# Patient Record
Sex: MALE | Race: WHITE | NOT HISPANIC OR LATINO | URBAN - METROPOLITAN AREA
[De-identification: names, ages, dates, MRNs, and addresses within clinical notes are randomized per-mention and may not be internally consistent; named-entity substitution may affect disease eponyms.]

---

## 2023-09-29 ENCOUNTER — TELEPHONE (OUTPATIENT)
Dept: PODIATRY | Facility: CLINIC | Age: 48
End: 2023-09-29

## 2023-09-29 NOTE — TELEPHONE ENCOUNTER
I called and left a message for the patient regarding his orthotics. The insurance does cover orthotics, but he would need to meet his individual deductible first, which is $500. It would be in his best interest to pay for the orthotics as a self-pay, which would be $350. The self-pay amount would be due at the time of his appointment. Please call us at [phone] to let us know if you are going to go forward with the orthotics or not. This way we can prepare for your appointment as needed. If you have any additional questions, please call us at [phone].

## 2024-03-05 ENCOUNTER — TELEPHONE (OUTPATIENT)
Age: 49
End: 2024-03-05

## 2024-03-05 NOTE — TELEPHONE ENCOUNTER
Caller: Scooby Timer    Doctor/Office: Dr. Obando/[facility]#: [phone]    Escalation: Care Patient was last seen in August 23. He now decided he would like to move forward with orthotics. Please return call. Thank you

## 2024-03-07 DIAGNOSIS — Q66.71 PES CAVUS OF BOTH FEET: Primary | ICD-10-CM

## 2024-03-07 DIAGNOSIS — Q66.72 PES CAVUS OF BOTH FEET: Primary | ICD-10-CM

## 2024-03-14 ENCOUNTER — TELEPHONE (OUTPATIENT)
Dept: PAIN MEDICINE | Facility: CLINIC | Age: 49
End: 2024-03-14

## 2024-03-14 NOTE — TELEPHONE ENCOUNTER
Tried to contact Scooby about his orthotic casting appt. Asked him to please return my call so we can schedule the appt for him

## 2024-04-17 NOTE — PROGRESS NOTES
Patient ID: Scooby Barba is a 48 y.o. male Date of Birth 1975       No chief complaint on file.            Diagnosis:  1. Pes cavus of both feet    2. Plantar fasciitis      1. Pedal evaluation with socks and shoes removed.  2. Discussed biomechanics, etiology and treatment options for heel/arch pain.  3. Patient given stretching exercises to do at home, ice, no barefoot.  4.  I inspected patient's current orthotics, the right device fits appropriately, left does not provide adequate biomechanical support and the height of the device does not match the foot structure.  We have precertified new custom orthotics, patient is aware he has a high deductible and will pay out of pocket.  5.  Gait and biomechanical examination was performed.  6.  Custom orthotic casting was performed with plaster Rosa for the molds, forefoot partially loaded, subtalar joint neutral, custom orthotic prescription and order form was filled out for [employer].  7.  Patient understands and agrees with the plan and he will follow-up in 4-6 weeks to  orthotics.         Subjective:   Patient presents today with chief complaint of left heel pain.  He states he is struggled with this every summer but this year it has been the worst, he tends to be quite more active in the summertime and has experienced it historically every summer by the end of the summer it usually goes away, this is not the case this year.  He does wear custom orthotics which are approximately 15 years old.  He does tend to go barefoot, he runs [employer] races, play soccer, washes his cars etc. he presents today to be casted for new custom orthotics.          The following portions of the patient's history were reviewed and updated as appropriate: allergies, current medications, past family history, past medical history, past social history, past surgical history, and problem list.        Objective:  There were no vitals taken for this visit.    Review of Systems    Constitutional:  Negative for chills and fever.   HENT:  Negative for ear pain and sore throat.    Eyes:  Negative for pain and visual disturbance.   Respiratory:  Negative for cough and shortness of breath.    Cardiovascular:  Negative for chest pain and palpitations.   Gastrointestinal:  Negative for abdominal pain and vomiting.   Genitourinary:  Negative for dysuria and hematuria.   Musculoskeletal:  Negative for arthralgias and back pain.        Left heel/arch pain   Skin:  Negative for color change and rash.   Neurological:  Negative for seizures and syncope.   All other systems reviewed and are negative.      Physical Exam  Constitutional:       Appearance: Normal appearance. He is normal weight.   HENT:      Head: Normocephalic and atraumatic.      Right Ear: External ear normal.      Left Ear: External ear normal.      Nose: Nose normal.      Mouth/Throat:      Mouth: Mucous membranes are moist.      Pharynx: Oropharynx is clear.   Eyes:      Conjunctiva/sclera: Conjunctivae normal.      Pupils: Pupils are equal, round, and reactive to light.   Cardiovascular:      Pulses: Normal pulses.           Dorsalis pedis pulses are 2+ on the right side and 2+ on the left side.        Posterior tibial pulses are 2+ on the right side and 2+ on the left side.   Pulmonary:      Effort: Pulmonary effort is normal.   Musculoskeletal:      Cervical back: Normal range of motion.      Right lower leg: No edema.      Left lower leg: No edema.   Feet:      Right foot:      Protective Sensation: 10 sites tested.  10 sites sensed.      Skin integrity: Skin integrity normal.      Toenail Condition: Right toenails are normal.      Left foot:      Protective Sensation: 10 sites tested.  10 sites sensed.      Skin integrity: Skin integrity normal.      Toenail Condition: Left toenails are normal.      Comments: rigid pes cavus foot type bilateral, ankle equinus bilateral, tight plantar fascia bilateral, no signs of tarsal tunnel.  "Limited STJ eversion.  MMT is 5 out of 5 bilateral  Passive range of motion is pain-free and within normal limits.  Hammertoes 2 through 4 bilateral     Skin:     General: Skin is warm and dry.      Capillary Refill: Capillary refill takes less than 2 seconds.   Neurological:      General: No focal deficit present.      Mental Status: He is alert and oriented to person, place, and time. Mental status is at baseline.   Psychiatric:         Mood and Affect: Mood normal.         Behavior: Behavior normal.         Thought Content: Thought content normal.         Judgment: Judgment normal.                 No pertinent results found.      Rosa Obando, DANNIEM, DPM, FACFAS    Portions of the record may have been created with voice recognition software. Occasional wrong word or \"sound a like\" substitutions may have occurred due to the inherent limitations of voice recognition software. Read the chart carefully and recognize, using context, where substitutions have occurred.  "

## 2024-04-19 ENCOUNTER — PROCEDURE VISIT (OUTPATIENT)
Dept: PODIATRY | Facility: CLINIC | Age: 49
End: 2024-04-19
Payer: COMMERCIAL

## 2024-04-19 VITALS
SYSTOLIC BLOOD PRESSURE: 148 MMHG | HEART RATE: 60 BPM | WEIGHT: 192 LBS | DIASTOLIC BLOOD PRESSURE: 89 MMHG | BODY MASS INDEX: 23.87 KG/M2 | HEIGHT: 75 IN

## 2024-04-19 DIAGNOSIS — Q66.71 PES CAVUS OF BOTH FEET: Primary | ICD-10-CM

## 2024-04-19 DIAGNOSIS — Q66.72 PES CAVUS OF BOTH FEET: Primary | ICD-10-CM

## 2024-04-19 DIAGNOSIS — M72.2 PLANTAR FASCIITIS: ICD-10-CM

## 2024-04-19 PROCEDURE — 99213 OFFICE O/P EST LOW 20 MIN: CPT | Performed by: PODIATRIST

## 2024-05-02 ENCOUNTER — TELEPHONE (OUTPATIENT)
Dept: PODIATRY | Facility: CLINIC | Age: 49
End: 2024-05-02

## 2024-05-03 NOTE — TELEPHONE ENCOUNTER
MICHAEL for patient to call [phone] to schedule an appointment for his orthotic  and test walk with Dr. Obando.

## 2024-05-22 NOTE — PROGRESS NOTES
"Patient ID: Scooby Barba is a 48 y.o. male Date of Birth 1975       Chief Complaint   Patient presents with    Foot Problem     [facility] Orthotics             Diagnosis:  1. Pes cavus of both feet    Bilateral pedal examination with socks and shoes removed bilaterally.  Biomechanical and gait examination performed with and without custom orthotics.  Orthotics fit to shoes and feet, break in period explained.  Patient understands and agrees with the plan and will follow up in 4 weeks at which time he will order duplicate orthotics for his soccer shoes.      Subjective:   Scooby presents today to  custom orthotics for plantar fasciitis and arch pain.          The following portions of the patient's history were reviewed and updated as appropriate: allergies, current medications, past family history, past medical history, past social history, past surgical history, and problem list.        Objective:  /86 (BP Location: Left arm, Patient Position: Sitting, Cuff Size: Adult)   Pulse 60   Ht 6' 3\" (1.905 m) Comment: verbal  Wt 87.1 kg (192 lb)   BMI 24.00 kg/m²     Review of Systems   Constitutional:  Negative for chills and fever.   HENT:  Negative for ear pain and sore throat.    Eyes:  Negative for pain and visual disturbance.   Respiratory:  Negative for cough and shortness of breath.    Cardiovascular:  Negative for chest pain and palpitations.   Gastrointestinal:  Negative for abdominal pain and vomiting.   Genitourinary:  Negative for dysuria and hematuria.   Musculoskeletal:  Negative for arthralgias and back pain.        BL arch pain   Skin:  Negative for color change and rash.   Neurological:  Negative for seizures and syncope.   All other systems reviewed and are negative.      Physical Exam  Constitutional:       Appearance: Normal appearance. He is normal weight.   HENT:      Head: Normocephalic and atraumatic.      Right Ear: External ear normal.      Left Ear: External ear normal.      Nose: " "Nose normal.      Mouth/Throat:      Mouth: Mucous membranes are moist.      Pharynx: Oropharynx is clear.   Eyes:      Conjunctiva/sclera: Conjunctivae normal.      Pupils: Pupils are equal, round, and reactive to light.   Cardiovascular:      Pulses: Normal pulses.           Dorsalis pedis pulses are 2+ on the right side and 2+ on the left side.        Posterior tibial pulses are 2+ on the right side and 2+ on the left side.   Pulmonary:      Effort: Pulmonary effort is normal.   Musculoskeletal:      Cervical back: Normal range of motion.      Right lower leg: No edema.      Left lower leg: No edema.   Feet:      Right foot:      Protective Sensation: 10 sites tested.  10 sites sensed.      Skin integrity: Skin integrity normal.      Toenail Condition: Right toenails are normal.      Left foot:      Protective Sensation: 10 sites tested.  10 sites sensed.      Skin integrity: Skin integrity normal.      Toenail Condition: Left toenails are normal.      Comments: rigid pes cavus foot type bilateral, ankle equinus bilateral, tight plantar fascia bilateral, no signs of tarsal tunnel. Limited STJ eversion.  MMT is 5 out of 5 bilateral  Passive range of motion is pain-free and within normal limits.  Hammertoes 2 through 4 bilateral     Skin:     General: Skin is warm and dry.      Capillary Refill: Capillary refill takes less than 2 seconds.   Neurological:      General: No focal deficit present.      Mental Status: He is alert and oriented to person, place, and time. Mental status is at baseline.   Psychiatric:         Mood and Affect: Mood normal.         Behavior: Behavior normal.         Thought Content: Thought content normal.         Judgment: Judgment normal.           No pertinent results found.      Rosa Obando, AMARJIT, DPM, FACFAS    Portions of the record may have been created with voice recognition software. Occasional wrong word or \"sound a like\" substitutions may have occurred due to the inherent " limitations of voice recognition software. Read the chart carefully and recognize, using context, where substitutions have occurred.

## 2024-05-24 ENCOUNTER — OFFICE VISIT (OUTPATIENT)
Dept: PODIATRY | Facility: CLINIC | Age: 49
End: 2024-05-24

## 2024-05-24 VITALS
SYSTOLIC BLOOD PRESSURE: 138 MMHG | BODY MASS INDEX: 23.87 KG/M2 | WEIGHT: 192 LBS | HEIGHT: 75 IN | HEART RATE: 60 BPM | DIASTOLIC BLOOD PRESSURE: 86 MMHG

## 2024-05-24 DIAGNOSIS — Q66.71 PES CAVUS OF BOTH FEET: Primary | ICD-10-CM

## 2024-05-24 DIAGNOSIS — Q66.72 PES CAVUS OF BOTH FEET: Primary | ICD-10-CM

## 2024-08-05 ENCOUNTER — TELEPHONE (OUTPATIENT)
Age: 49
End: 2024-08-05

## 2024-08-05 NOTE — TELEPHONE ENCOUNTER
Caller: Patient     Doctor: Topher     Reason for call: patient asking to order another pair of orthotics 3 quarter length instead of full     Please advise     Call back#: [phone]

## 2024-08-07 NOTE — TELEPHONE ENCOUNTER
Spoke to Scooby regarding second pair. He explained he was billed twice, so I placed a call to billing. Billing to fix and then I will call him to collect payment for second pair.

## 2024-09-24 ENCOUNTER — TELEPHONE (OUTPATIENT)
Dept: PODIATRY | Facility: CLINIC | Age: 49
End: 2024-09-24

## 2024-09-24 NOTE — TELEPHONE ENCOUNTER
S/w pt to let him know his orthotics came into office. Pt will be picking up in [facility] second floor on Thursday. Made Ese aware pt will be coming in.